# Patient Record
Sex: FEMALE | Race: OTHER | NOT HISPANIC OR LATINO | URBAN - METROPOLITAN AREA
[De-identification: names, ages, dates, MRNs, and addresses within clinical notes are randomized per-mention and may not be internally consistent; named-entity substitution may affect disease eponyms.]

---

## 2024-02-27 ENCOUNTER — EMERGENCY (EMERGENCY)
Facility: HOSPITAL | Age: 29
LOS: 1 days | Discharge: HOME | End: 2024-02-27
Attending: EMERGENCY MEDICINE | Admitting: EMERGENCY MEDICINE
Payer: COMMERCIAL

## 2024-02-27 VITALS
OXYGEN SATURATION: 100 % | DIASTOLIC BLOOD PRESSURE: 73 MMHG | RESPIRATION RATE: 15 BRPM | HEART RATE: 100 BPM | SYSTOLIC BLOOD PRESSURE: 112 MMHG | TEMPERATURE: 99 F

## 2024-02-27 VITALS
RESPIRATION RATE: 18 BRPM | WEIGHT: 154.32 LBS | TEMPERATURE: 99 F | HEART RATE: 133 BPM | OXYGEN SATURATION: 100 % | DIASTOLIC BLOOD PRESSURE: 70 MMHG | SYSTOLIC BLOOD PRESSURE: 112 MMHG

## 2024-02-27 LAB
ALBUMIN SERPL ELPH-MCNC: 4.3 G/DL — SIGNIFICANT CHANGE UP (ref 3.3–5)
ALP SERPL-CCNC: 92 U/L — SIGNIFICANT CHANGE UP (ref 40–120)
ALT FLD-CCNC: 8 U/L — SIGNIFICANT CHANGE UP (ref 4–33)
ANION GAP SERPL CALC-SCNC: 12 MMOL/L — SIGNIFICANT CHANGE UP (ref 7–14)
AST SERPL-CCNC: 15 U/L — SIGNIFICANT CHANGE UP (ref 4–32)
BASE EXCESS BLDV CALC-SCNC: -3.7 MMOL/L — LOW (ref -2–3)
BASOPHILS # BLD AUTO: 0.01 K/UL — SIGNIFICANT CHANGE UP (ref 0–0.2)
BASOPHILS NFR BLD AUTO: 0.1 % — SIGNIFICANT CHANGE UP (ref 0–2)
BILIRUB SERPL-MCNC: 1.3 MG/DL — HIGH (ref 0.2–1.2)
BLOOD GAS VENOUS COMPREHENSIVE RESULT: SIGNIFICANT CHANGE UP
BUN SERPL-MCNC: 8 MG/DL — SIGNIFICANT CHANGE UP (ref 7–23)
CALCIUM SERPL-MCNC: 9 MG/DL — SIGNIFICANT CHANGE UP (ref 8.4–10.5)
CHLORIDE BLDV-SCNC: 104 MMOL/L — SIGNIFICANT CHANGE UP (ref 96–108)
CHLORIDE SERPL-SCNC: 105 MMOL/L — SIGNIFICANT CHANGE UP (ref 98–107)
CO2 BLDV-SCNC: 23.4 MMOL/L — SIGNIFICANT CHANGE UP (ref 22–26)
CO2 SERPL-SCNC: 21 MMOL/L — LOW (ref 22–31)
CREAT SERPL-MCNC: 0.56 MG/DL — SIGNIFICANT CHANGE UP (ref 0.5–1.3)
EGFR: 129 ML/MIN/1.73M2 — SIGNIFICANT CHANGE UP
EOSINOPHIL # BLD AUTO: 0.04 K/UL — SIGNIFICANT CHANGE UP (ref 0–0.5)
EOSINOPHIL NFR BLD AUTO: 0.4 % — SIGNIFICANT CHANGE UP (ref 0–6)
GAS PNL BLDV: 134 MMOL/L — LOW (ref 136–145)
GLUCOSE BLDV-MCNC: 90 MG/DL — SIGNIFICANT CHANGE UP (ref 70–99)
GLUCOSE SERPL-MCNC: 88 MG/DL — SIGNIFICANT CHANGE UP (ref 70–99)
HCG SERPL-ACNC: <1 MIU/ML — SIGNIFICANT CHANGE UP
HCO3 BLDV-SCNC: 22 MMOL/L — SIGNIFICANT CHANGE UP (ref 22–29)
HCT VFR BLD CALC: 41.1 % — SIGNIFICANT CHANGE UP (ref 34.5–45)
HCT VFR BLDA CALC: 42 % — SIGNIFICANT CHANGE UP (ref 34.5–46.5)
HGB BLD CALC-MCNC: 13.9 G/DL — SIGNIFICANT CHANGE UP (ref 11.7–16.1)
HGB BLD-MCNC: 13.6 G/DL — SIGNIFICANT CHANGE UP (ref 11.5–15.5)
IANC: 8.92 K/UL — HIGH (ref 1.8–7.4)
IMM GRANULOCYTES NFR BLD AUTO: 0.3 % — SIGNIFICANT CHANGE UP (ref 0–0.9)
LACTATE BLDV-MCNC: 1.3 MMOL/L — SIGNIFICANT CHANGE UP (ref 0.5–2)
LIDOCAIN IGE QN: 24 U/L — SIGNIFICANT CHANGE UP (ref 7–60)
LYMPHOCYTES # BLD AUTO: 0.72 K/UL — LOW (ref 1–3.3)
LYMPHOCYTES # BLD AUTO: 6.9 % — LOW (ref 13–44)
MCHC RBC-ENTMCNC: 29.7 PG — SIGNIFICANT CHANGE UP (ref 27–34)
MCHC RBC-ENTMCNC: 33.1 GM/DL — SIGNIFICANT CHANGE UP (ref 32–36)
MCV RBC AUTO: 89.7 FL — SIGNIFICANT CHANGE UP (ref 80–100)
MONOCYTES # BLD AUTO: 0.66 K/UL — SIGNIFICANT CHANGE UP (ref 0–0.9)
MONOCYTES NFR BLD AUTO: 6.4 % — SIGNIFICANT CHANGE UP (ref 2–14)
NEUTROPHILS # BLD AUTO: 8.92 K/UL — HIGH (ref 1.8–7.4)
NEUTROPHILS NFR BLD AUTO: 85.9 % — HIGH (ref 43–77)
NRBC # BLD: 0 /100 WBCS — SIGNIFICANT CHANGE UP (ref 0–0)
NRBC # FLD: 0 K/UL — SIGNIFICANT CHANGE UP (ref 0–0)
PCO2 BLDV: 42 MMHG — SIGNIFICANT CHANGE UP (ref 39–52)
PH BLDV: 7.33 — SIGNIFICANT CHANGE UP (ref 7.32–7.43)
PLATELET # BLD AUTO: 237 K/UL — SIGNIFICANT CHANGE UP (ref 150–400)
PO2 BLDV: 38 MMHG — SIGNIFICANT CHANGE UP (ref 25–45)
POTASSIUM BLDV-SCNC: 3.8 MMOL/L — SIGNIFICANT CHANGE UP (ref 3.5–5.1)
POTASSIUM SERPL-MCNC: 3.8 MMOL/L — SIGNIFICANT CHANGE UP (ref 3.5–5.3)
POTASSIUM SERPL-SCNC: 3.8 MMOL/L — SIGNIFICANT CHANGE UP (ref 3.5–5.3)
PROT SERPL-MCNC: 7.9 G/DL — SIGNIFICANT CHANGE UP (ref 6–8.3)
RBC # BLD: 4.58 M/UL — SIGNIFICANT CHANGE UP (ref 3.8–5.2)
RBC # FLD: 11.9 % — SIGNIFICANT CHANGE UP (ref 10.3–14.5)
SAO2 % BLDV: 66 % — LOW (ref 67–88)
SODIUM SERPL-SCNC: 138 MMOL/L — SIGNIFICANT CHANGE UP (ref 135–145)
WBC # BLD: 10.38 K/UL — SIGNIFICANT CHANGE UP (ref 3.8–10.5)
WBC # FLD AUTO: 10.38 K/UL — SIGNIFICANT CHANGE UP (ref 3.8–10.5)

## 2024-02-27 PROCEDURE — 74177 CT ABD & PELVIS W/CONTRAST: CPT | Mod: 26,MC

## 2024-02-27 PROCEDURE — 99285 EMERGENCY DEPT VISIT HI MDM: CPT

## 2024-02-27 PROCEDURE — 93010 ELECTROCARDIOGRAM REPORT: CPT

## 2024-02-27 PROCEDURE — 76856 US EXAM PELVIC COMPLETE: CPT | Mod: 26

## 2024-02-27 RX ORDER — SODIUM CHLORIDE 9 MG/ML
1000 INJECTION, SOLUTION INTRAVENOUS ONCE
Refills: 0 | Status: COMPLETED | OUTPATIENT
Start: 2024-02-27 | End: 2024-02-27

## 2024-02-27 RX ORDER — MORPHINE SULFATE 50 MG/1
4 CAPSULE, EXTENDED RELEASE ORAL ONCE
Refills: 0 | Status: DISCONTINUED | OUTPATIENT
Start: 2024-02-27 | End: 2024-02-27

## 2024-02-27 RX ORDER — METOCLOPRAMIDE HCL 10 MG
10 TABLET ORAL ONCE
Refills: 0 | Status: COMPLETED | OUTPATIENT
Start: 2024-02-27 | End: 2024-02-27

## 2024-02-27 RX ORDER — ONDANSETRON 8 MG/1
1 TABLET, FILM COATED ORAL
Qty: 9 | Refills: 0
Start: 2024-02-27 | End: 2024-02-29

## 2024-02-27 RX ORDER — PIPERACILLIN AND TAZOBACTAM 4; .5 G/20ML; G/20ML
3.38 INJECTION, POWDER, LYOPHILIZED, FOR SOLUTION INTRAVENOUS ONCE
Refills: 0 | Status: COMPLETED | OUTPATIENT
Start: 2024-02-27 | End: 2024-02-27

## 2024-02-27 RX ADMIN — PIPERACILLIN AND TAZOBACTAM 200 GRAM(S): 4; .5 INJECTION, POWDER, LYOPHILIZED, FOR SOLUTION INTRAVENOUS at 22:04

## 2024-02-27 RX ADMIN — SODIUM CHLORIDE 1000 MILLILITER(S): 9 INJECTION, SOLUTION INTRAVENOUS at 17:23

## 2024-02-27 RX ADMIN — Medication 10 MILLIGRAM(S): at 17:23

## 2024-02-27 RX ADMIN — MORPHINE SULFATE 4 MILLIGRAM(S): 50 CAPSULE, EXTENDED RELEASE ORAL at 17:23

## 2024-02-27 NOTE — ED PROVIDER NOTE - CLINICAL SUMMARY MEDICAL DECISION MAKING FREE TEXT BOX
28-year-old female no stated past medical history presented emergency department for acute onset spontaneous nausea vomiting that started at 4 AM now with right lower quadrant abdominal pain and diarrhea.  No known sick contacts.  No vaginal cramping, bleeding, discharge; patient not sexually active at any point, LMP 2 weeks ago.  No bloody emesis or bowel movements.  No urinary symptoms.  Differential diagnoses include but is not limited to gastroenteritis (viral), considerable low suspicion for appendicitis, foodborne illness; consider but low suspicion for gynecological pathology including torsion or ovarian cyst.  Patient tachycardic but without ischemic changes on EKG.  Appears volume down.  Afebrile without systemic signs of infection.  Tachycardia but otherwise hemodynamically stable.  Exam notable for right lower quadrant tenderness to palpation questionably in the region McBurney's point, no rebound, no peritoneal signs.  Will get screening labs, lipase, VBG, transabdominal ultrasound to evaluate  pathology, CT abdomen pelvis.  IV fluids and symptomatic management.  Dispo pending reassessment, labs and imaging.

## 2024-02-27 NOTE — ED PROVIDER NOTE - OBJECTIVE STATEMENT
28-year-old female with no significant past medical history presenting to the ED for acute nausea and vomiting that started spontaneously around 4 AM with associated diarrhea.  Shortly after that patient started experiencing abdominal pain worse in the right lower quadrant.  No associated vaginal bleeding or cramping or discharge.  LMP 2 weeks ago.  Denies fever/chills.  Reports inability to tolerate p.o. intake.  Vomiting as recently as prior to the ED.  Tried Zofran prior to coming with minimal improvement.  Reports that pain radiates to the back.  Not sexually active.  Denies urinary symptoms.  No history of abdominal surgeries.  No known recent sick contacts.

## 2024-02-27 NOTE — ED PROVIDER NOTE - PATIENT PORTAL LINK FT
You can access the FollowMyHealth Patient Portal offered by [facility] by registering at the following website: [email or web address]. By joining [facility]’s FollowMyHealth portal, you will also be able to view your health information using other applications (apps) compatible with our system.

## 2024-02-27 NOTE — ED ADULT NURSE NOTE - NSFALLRISKINTERV_ED_ALL_ED

## 2024-02-27 NOTE — ED ADULT NURSE NOTE - OBJECTIVE STATEMENT
Receive pt. in Intake room 8 alert and oriented x 4, presenting to the ER with complaints of abdominal and back pain. Pt. stated " since 9pm last night I have pain in my abdomen and back, and I feel nauseous". Medicated as ordered, labs sent. Call bell place within reach.

## 2024-02-27 NOTE — ED PROVIDER NOTE - ATTENDING CONTRIBUTION TO CARE
Patient is a 28-year-old female who denies any chronic medical problems here for evaluation of nausea and vomiting that started around 4 AM.        28-year-old female with no significant past medical history presenting to the ED for acute nausea and vomiting that started spontaneously around 4 AM with associated diarrhea.  Shortly after that patient started experiencing abdominal pain worse in the right lower quadrant.  No associated vaginal bleeding or cramping or discharge.  LMP 2 weeks ago.  Denies fever/chills.  Reports inability to tolerate p.o. intake.  Vomiting as recently as prior to the ED.  Tried Zofran prior to coming with minimal improvement.  Reports that pain radiates to the back.  Not sexually active.  Denies urinary symptoms.  No history of abdominal surgeries.  No known recent sick contacts. Patient is a 28-year-old female who denies any chronic medical problems here for evaluation of nausea and vomiting that started around 4 AM. Patient states she woke up with vomiting.  She states she developed abdominal discomfort afterwards.  She states that she has had multiple episodes of nonbloody diarrhea.  Patient denies any vaginal bleeding or discharge.  She states that she has never been sexually active in her life.  LMP was 2 weeks ago.  No fevers or chills.  Patient reports that she took Zofran prior to coming in.  She denies any dysuria.  She denies any travel or sick contacts.   She states she has never had any abdominal surgeries.    VS noted  Gen. no acute distress, Non toxic   HEENT: EOMI, mmm  Lungs: CTAB/L no C/ W /R   CVS: RRR   Abd; Soft, mild tenderness right abdomen, no rebound or guarding, no CVA tenderness bilaterally  Ext: no edema  Skin: no rash  Neuro AAOx3 non focal clear speech  a/p:  morning, diarrhea, abdominal pain–patient has mild tenderness in the right abdomen.  Differential includes viral etiology such as viral gastroenteritis, appendicitis.  Patient is not sexually active.  Her pain is in her right lateral abdomen.  Palpable suspicion for torsion, will get transabdominal ultrasound.  - Shyann PERRY Patient is a 28-year-old female who denies any chronic medical problems here for evaluation of nausea and vomiting that started around 4 AM. Patient states she woke up with vomiting.  She states she developed abdominal discomfort afterwards.  She states that she has had multiple episodes of nonbloody diarrhea.  Patient denies any vaginal bleeding or discharge.  She states that she has never been sexually active in her life.  LMP was 2 weeks ago.  No fevers or chills.  Patient reports that she took Zofran prior to coming in.  She denies any dysuria.  She denies any travel or sick contacts.   She states she has never had any abdominal surgeries.    VS noted  Gen. no acute distress, Non toxic   HEENT: EOMI, mmm  Lungs: CTAB/L no C/ W /R   CVS: RRR   Abd; Soft, mild tenderness right abdomen, no rebound or guarding, no CVA tenderness bilaterally  Ext: no edema  Skin: no rash  Neuro AAOx3 non focal clear speech  a/p:  vomiting, diarrhea, abdominal pain–patient has mild tenderness in the right abdomen.  Differential includes viral etiology such as viral gastroenteritis, appendicitis.  Patient is not sexually active.  Her pain is in her right lateral abdomen.  Palpable suspicion for torsion, will get transabdominal ultrasound.  - Shyann PERRY

## 2024-02-27 NOTE — ED ADULT TRIAGE NOTE - CHIEF COMPLAINT QUOTE
pt. presents with vomiting and diarrhea x1 day. pt. denies blood in emesis and stool, pt. actively vomiting in triage, appears uncomfortable. pt. denies chest pain, SOB, headaches, dizzines. pt. endorses taking zofran prior to arrival without relief. pt. denies pertinent past medical Hx.

## 2024-02-27 NOTE — ED PROVIDER NOTE - PROGRESS NOTE DETAILS
Candido Castellanos MD PGY1: Patient reassessed, stable. Patient endorsing improvement in pain. CTAP showing equivocal evidence of appendicitis with borderline enlargement and periappendiceal fluid. Surgery consulted. Candido Castellanos MD PGY1: Patient reassessed, stable. Surgery awaiting patient return from US. US negative. Pending surgery recs. SHELLEY Recio: Patient received at sign-out pending surgery recommendations.  Upon reassessment patient states that she is feeling well, abdomen is soft, nontender.  Case discussed with surgery resident Myke who states that patient okay for discharge less likely for symptoms to be related to appendicitis.  Patient counseled regarding likelihood of viral gastroenteritis given constellation of symptoms and findings.  Patient verbalized understanding, all questions were answered strict return precautions given.

## 2024-02-27 NOTE — CONSULT NOTE ADULT - ASSESSMENT
Assessment: 28F with anxiety, 12 hours of abdominal pain with diarrhea and vomiting.  Pain has improved since coming to ED, no fevers, WBC normal, CT equivocal.     Recs:  - no surgical intervention  - dispo and remainder of care per ED  - D/w Dr. Rajesh PERRY, PGY3  B Team Surgery   [id number]

## 2024-02-27 NOTE — CONSULT NOTE ADULT - SUBJECTIVE AND OBJECTIVE BOX
SURGERY CONSULT NOTE  --------------------------------------------------------------------------------------------    Patient is a 28y old Female who presents with a chief complaint of diarrhea, vomiting, abdominal pain.     HPI:  28 year old  with abdominal pain since 10AM, has been vomiting and having diarrhea of same duration. 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  Lives alone, chronic anxiety on alprazolam.  Crampy right sided pain started after the vomiting and diarrhea.  Since arriving in ED, still no fevers and pain has totally abated.  PSH breast augmentation and facial plastic surgery.     ROS: 10-system review is otherwise negative except HPI above.      PAST MEDICAL & SURGICAL HISTORY:  No pertinent past medical history      No significant past surgical history        FAMILY HISTORY:      SOCIAL HISTORY:      ALLERGIES: Nuts (Unknown)  No Known Drug Allergies      HOME MEDICATIONS:     CURRENT MEDICATIONS  MEDICATIONS (STANDING): piperacillin/tazobactam IVPB... 3.375 Gram(s) IV Intermittent once    MEDICATIONS (PRN):  --------------------------------------------------------------------------------------------    Vitals:   T(C): 37.1 (02-27-24 @ 15:02), Max: 37.1 (02-27-24 @ 15:02)  HR: 133 (02-27-24 @ 15:02) (133 - 133)  BP: 112/70 (02-27-24 @ 15:02) (112/70 - 112/70)  RR: 18 (02-27-24 @ 15:02) (18 - 18)  SpO2: 100% (02-27-24 @ 15:02) (100% - 100%)  CAPILLARY BLOOD GLUCOSE    Weight (kg): 70 (02-27 @ 15:02)    PHYSICAL EXAM:   General: NAD, Lying in bed comfortably  Neuro: A+Ox3  HEENT: NC/AT, EOMI  Neck: Soft, supple  Cardio: RRR, nml S1/S2  Resp: Good effort, CTA b/l  GI/Abd: Soft, NT/ND, no rebound/guarding, no masses palpated no rovsing   Musculoskeletal: All 4 extremities moving spontaneously, no limitations.  --------------------------------------------------------------------------------------------    LABS  CBC (02-27 @ 17:27)                              13.6                           10.38   )----------------(  237        85.9<H>% Neutrophils, 6.9<L>% Lymphocytes, ANC: 8.92<H>                              41.1      BMP (02-27 @ 17:27)             138     |  105     |  8     		Ca++ --      Ca 9.0                ---------------------------------( 88    		Mg --                 3.8     |  21<L>   |  0.56  			Ph --        LFTs (02-27 @ 17:27)      TPro 7.9 / Alb 4.3 / TBili 1.3<H> / DBili -- / AST 15 / ALT 8 / AlkPhos 92          VBG (02-27 @ 17:27)     7.33 / 42 / 38 / 22 / -3.7<L> / 66.0<L>%     Lactate: 1.3    --------------------------------------------------------------------------------------------    MICROBIOLOGY  Urinalysis (02-27 @ 17:27):     Color:  / Appearance:  / SG:  / pH:  / Gluc: 88 / Ketones:  / Bili:  / Urobili:  / Protein : / Nitrites:  / Leuk.Est:  / RBC:  / WBC:  / Sq Epi:  / Non Sq Epi:  / Bacteria          --------------------------------------------------------------------------------------------    IMAGING  < from: CT Abdomen and Pelvis w/ IV Cont (02.27.24 @ 18:02) >  FINDINGS:  LOWER CHEST: Bilateral breast implants.    LIVER: Within normal limits.  BILE DUCTS: Normal caliber.  GALLBLADDER: Within normal limits.  SPLEEN: Within normal limits.  PANCREAS: Within normal limits.  ADRENALS: Within normal limits.  KIDNEYS/URETERS: Within normal limits.    BLADDER: Within normal limits.  REPRODUCTIVE ORGANS: Uterus and adnexa within normal limits. Incidental   small corpus luteum on the left side.    BOWEL: No bowel obstruction. Appendix is borderline prominent near the   base measuring approximately 7 mm. There is trace periappendiceal   stranding.  PERITONEUM: No ascites.  VESSELS: Within normal limits.  RETROPERITONEUM/LYMPH NODES: No lymphadenopathy.  ABDOMINAL WALL: Within normal limits.  BONES: Within normal limits.    IMPRESSION:  Imaging appearance is equivocal for appendicitis. Correlate clinically.

## 2024-02-27 NOTE — ED PROVIDER NOTE - PHYSICAL EXAMINATION
GEN: Patient awake and alert. No acute distress, non-toxic. Uncomfortable appearing, diaphoretic.   Head: Normocephalic, atraumatic.  Neck: Nontender, full ROM.   Eyes: PERRLA b/l. EOMI, no scleral icterus, no conjunctival injection. Dry mucous membranes.  CARDIAC: Regular tachycardia. Normal S1, S2. No murmur, rubs, or gallops. No peripheral edema noted.  PULM: Speaking in full sentences. CTA B/L no wheeze, rales or rhonchi. No signs of respiratory distress, no accessory muscle usage or nasal flaring.  ABD: TTP in RLQ near McBurney point and mildly in epigastric region, soft, nondistended. No rebound, no involuntary guarding.  : No CVA tenderness, no suprapubic tenderness. Patient deferring pelvic exam at this time.   MSK: Moving all extremities spontaneously. Full ROM in extremities. No obvious deformity.  NEURO: A&Ox3, no focal neurological deficits.   SKIN: Warm, diaphoretic, no rash, no lesions, no open wounds.